# Patient Record
Sex: MALE | NOT HISPANIC OR LATINO | Employment: UNEMPLOYED
[De-identification: names, ages, dates, MRNs, and addresses within clinical notes are randomized per-mention and may not be internally consistent; named-entity substitution may affect disease eponyms.]

---

## 2018-03-23 ENCOUNTER — IMAGING SERVICES (OUTPATIENT)
Dept: OTHER | Age: 63
End: 2018-03-23

## 2018-03-23 ENCOUNTER — HOSPITAL (OUTPATIENT)
Dept: OTHER | Age: 63
End: 2018-03-23

## 2020-08-24 ENCOUNTER — APPOINTMENT (OUTPATIENT)
Dept: GENERAL RADIOLOGY | Age: 65
End: 2020-08-24
Attending: EMERGENCY MEDICINE

## 2020-08-24 ENCOUNTER — HOSPITAL ENCOUNTER (EMERGENCY)
Age: 65
Discharge: HOME | End: 2020-08-24
Attending: EMERGENCY MEDICINE

## 2020-08-24 ENCOUNTER — APPOINTMENT (OUTPATIENT)
Dept: CT IMAGING | Age: 65
End: 2020-08-24
Attending: EMERGENCY MEDICINE

## 2020-08-24 VITALS
HEART RATE: 70 BPM | WEIGHT: 198.41 LBS | RESPIRATION RATE: 16 BRPM | TEMPERATURE: 96.8 F | DIASTOLIC BLOOD PRESSURE: 79 MMHG | SYSTOLIC BLOOD PRESSURE: 122 MMHG | OXYGEN SATURATION: 97 %

## 2020-08-24 DIAGNOSIS — R55 SYNCOPE, UNSPECIFIED SYNCOPE TYPE: Primary | ICD-10-CM

## 2020-08-24 LAB
ANION GAP SERPL CALC-SCNC: 10 MMOL/L (ref 10–20)
ATRIAL RATE (BPM): 74
BASOPHILS # BLD: 0 K/MCL (ref 0–0.3)
BASOPHILS NFR BLD: 0 %
BUN SERPL-MCNC: 21 MG/DL (ref 6–20)
BUN/CREAT SERPL: 21 (ref 7–25)
CALCIUM SERPL-MCNC: 9.1 MG/DL (ref 8.4–10.2)
CHLORIDE SERPL-SCNC: 106 MMOL/L (ref 98–107)
CO2 SERPL-SCNC: 26 MMOL/L (ref 21–32)
CREAT SERPL-MCNC: 1.01 MG/DL (ref 0.67–1.17)
DIFFERENTIAL METHOD BLD: ABNORMAL
EOSINOPHIL # BLD: 0.2 K/MCL (ref 0.1–0.5)
EOSINOPHIL NFR BLD: 2 %
ERYTHROCYTE [DISTWIDTH] IN BLOOD: 14.6 % (ref 11–15)
GLUCOSE SERPL-MCNC: 87 MG/DL (ref 65–99)
HCT VFR BLD CALC: 39.3 % (ref 39–51)
HGB BLD-MCNC: 13.1 G/DL (ref 13–17)
IMM GRANULOCYTES # BLD AUTO: 0 K/MCL (ref 0–0.2)
IMM GRANULOCYTES NFR BLD: 0 %
LYMPHOCYTES # BLD: 1.5 K/MCL (ref 1–4)
LYMPHOCYTES NFR BLD: 17 %
MCH RBC QN AUTO: 30.2 PG (ref 26–34)
MCHC RBC AUTO-ENTMCNC: 33.3 G/DL (ref 32–36.5)
MCV RBC AUTO: 90.6 FL (ref 78–100)
MONOCYTES # BLD: 0.7 K/MCL (ref 0.3–0.9)
MONOCYTES NFR BLD: 8 %
NEUTROPHILS # BLD: 6.5 K/MCL (ref 1.8–7.7)
NEUTROPHILS NFR BLD: 73 %
NRBC BLD MANUAL-RTO: 0 /100 WBC
P AXIS (DEGREES): 51
PLATELET # BLD: 222 K/MCL (ref 140–450)
POTASSIUM SERPL-SCNC: 4.5 MMOL/L (ref 3.4–5.1)
PR-INTERVAL (MSEC): 181
QRS-INTERVAL (MSEC): 95
QT-INTERVAL (MSEC): 374
QTC: 415
R AXIS (DEGREES): -6
RBC # BLD: 4.34 MIL/MCL (ref 4.5–5.9)
REPORT TEXT: NORMAL
SODIUM SERPL-SCNC: 137 MMOL/L (ref 135–145)
T AXIS (DEGREES): 60
TROPONIN I SERPL HS-MCNC: <0.02 NG/ML
VENTRICULAR RATE EKG/MIN (BPM): 74
WBC # BLD: 8.9 K/MCL (ref 4.2–11)

## 2020-08-24 PROCEDURE — 71101 X-RAY EXAM UNILAT RIBS/CHEST: CPT

## 2020-08-24 PROCEDURE — 96374 THER/PROPH/DIAG INJ IV PUSH: CPT

## 2020-08-24 PROCEDURE — 10002800 HB RX 250 W HCPCS

## 2020-08-24 PROCEDURE — 72125 CT NECK SPINE W/O DYE: CPT

## 2020-08-24 PROCEDURE — 73502 X-RAY EXAM HIP UNI 2-3 VIEWS: CPT

## 2020-08-24 PROCEDURE — 10002800 HB RX 250 W HCPCS: Performed by: EMERGENCY MEDICINE

## 2020-08-24 PROCEDURE — 85025 COMPLETE CBC W/AUTO DIFF WBC: CPT

## 2020-08-24 PROCEDURE — 70450 CT HEAD/BRAIN W/O DYE: CPT

## 2020-08-24 PROCEDURE — 73030 X-RAY EXAM OF SHOULDER: CPT

## 2020-08-24 PROCEDURE — 96375 TX/PRO/DX INJ NEW DRUG ADDON: CPT

## 2020-08-24 PROCEDURE — 99285 EMERGENCY DEPT VISIT HI MDM: CPT | Performed by: EMERGENCY MEDICINE

## 2020-08-24 PROCEDURE — 99284 EMERGENCY DEPT VISIT MOD MDM: CPT

## 2020-08-24 PROCEDURE — 93005 ELECTROCARDIOGRAM TRACING: CPT | Performed by: EMERGENCY MEDICINE

## 2020-08-24 PROCEDURE — 73630 X-RAY EXAM OF FOOT: CPT

## 2020-08-24 PROCEDURE — 80048 BASIC METABOLIC PNL TOTAL CA: CPT

## 2020-08-24 PROCEDURE — 10002803 HB RX 637: Performed by: EMERGENCY MEDICINE

## 2020-08-24 PROCEDURE — 84484 ASSAY OF TROPONIN QUANT: CPT

## 2020-08-24 RX ORDER — HYDROCODONE BITARTRATE AND ACETAMINOPHEN 10; 325 MG/1; MG/1
1 TABLET ORAL ONCE
Status: COMPLETED | OUTPATIENT
Start: 2020-08-24 | End: 2020-08-24

## 2020-08-24 RX ORDER — KETOROLAC TROMETHAMINE 15 MG/ML
INJECTION, SOLUTION INTRAMUSCULAR; INTRAVENOUS
Status: COMPLETED
Start: 2020-08-24 | End: 2020-08-24

## 2020-08-24 RX ADMIN — KETOROLAC TROMETHAMINE 15 MG: 15 INJECTION, SOLUTION INTRAMUSCULAR; INTRAVENOUS at 14:59

## 2020-08-24 RX ADMIN — MORPHINE SULFATE 6 MG: 4 INJECTION, SOLUTION INTRAMUSCULAR; INTRAVENOUS at 15:11

## 2020-08-24 RX ADMIN — HYDROCODONE BITARTRATE AND ACETAMINOPHEN 1 TABLET: 10; 325 TABLET ORAL at 12:52

## 2020-08-24 ASSESSMENT — ENCOUNTER SYMPTOMS
NUMBNESS: 0
CONSTIPATION: 0
DIARRHEA: 0
FEVER: 0
SHORTNESS OF BREATH: 0
HEADACHES: 0
COUGH: 0
NAUSEA: 0
FATIGUE: 0
RHINORRHEA: 0
EYE PAIN: 0
WEAKNESS: 0
LIGHT-HEADEDNESS: 0
CHILLS: 0
DIZZINESS: 0
ABDOMINAL PAIN: 0
VOMITING: 0
SORE THROAT: 0

## 2020-08-24 ASSESSMENT — PAIN SCALES - GENERAL: PAINLEVEL_OUTOF10: 9

## 2020-09-27 ENCOUNTER — HOSPITAL (OUTPATIENT)
Dept: OTHER | Age: 65
End: 2020-09-27

## 2020-09-27 ENCOUNTER — DIAGNOSTIC TRANS (OUTPATIENT)
Dept: OTHER | Age: 65
End: 2020-09-27

## 2020-09-27 LAB
ALBUMIN SERPL-MCNC: 4.2 G/DL (ref 3.6–5.1)
ALBUMIN/GLOB SERPL: 0.9 {RATIO} (ref 1–2.4)
ALP SERPL-CCNC: 120 UNITS/L (ref 45–117)
ALT SERPL-CCNC: 25 UNITS/L
ANALYZER ANC (IANC): ABNORMAL
ANION GAP SERPL CALC-SCNC: 19 MMOL/L (ref 10–20)
AST SERPL-CCNC: 26 UNITS/L
BASE DEFICIT BLDA-SCNC: ABNORMAL MMOL/L
BASE EXCESS BLDA CALC-SCNC: 1 MMOL/L (ref 0–3)
BASOPHILS # BLD: 0 K/MCL (ref 0–0.3)
BASOPHILS NFR BLD: 0 %
BDY SITE: ABNORMAL
BILIRUB SERPL-MCNC: 0.7 MG/DL (ref 0.2–1)
BODY TEMPERATURE: 37 DEGREES
BUN SERPL-MCNC: 30 MG/DL (ref 6–20)
BUN SERPL-MCNC: 36 MG/DL (ref 6–20)
BUN SERPL-MCNC: 37 MG/DL (ref 6–20)
BUN SERPL-MCNC: 39 MG/DL (ref 6–20)
BUN/CREAT SERPL: 12 (ref 7–25)
BUN/CREAT SERPL: 19 (ref 7–25)
BUN/CREAT SERPL: 22 (ref 7–25)
BUN/CREAT SERPL: 22 (ref 7–25)
CA-I BLD ISE-SCNC: 1.2 MMOL/L (ref 1.15–1.29)
CA-I BLDA-SCNC: ABNORMAL MMOL/L
CALCIUM SERPL-MCNC: 9.8 MG/DL (ref 8.4–10.2)
CHLORIDE BLD-SCNC: 103 MMOL/L (ref 98–107)
CHLORIDE SERPL-SCNC: 102 MMOL/L (ref 98–107)
CO2 SERPL-SCNC: 26 MMOL/L (ref 21–32)
COHGB MFR BLD: 1.4 %
CONDITION-RC: ABNORMAL
CONDITION: ABNORMAL
CREAT SERPL-MCNC: 1.35 MG/DL (ref 0.67–1.17)
CREAT SERPL-MCNC: 1.65 MG/DL (ref 0.67–1.17)
CREAT SERPL-MCNC: 2.01 MG/DL (ref 0.67–1.17)
CREAT SERPL-MCNC: 3.12 MG/DL (ref 0.67–1.17)
DIFFERENTIAL METHOD BLD: ABNORMAL
EOSINOPHIL # BLD: 0 K/MCL (ref 0.1–0.5)
EOSINOPHIL NFR BLD: 0 %
ERYTHROCYTE [DISTWIDTH] IN BLOOD: 13.7 % (ref 11–15)
ETHANOL SERPL-MCNC: NORMAL MG/DL
GLOBULIN SER-MCNC: 4.5 G/DL (ref 2–4)
GLUCOSE BLD-MCNC: 112 MG/DL (ref 65–99)
GLUCOSE SERPL-MCNC: 126 MG/DL (ref 65–99)
HCO3 BLDA-SCNC: 26 MMOL/L (ref 22–28)
HCT VFR BLD CALC: 40.3 % (ref 39–51)
HGB BLD-MCNC: 11.9 G/DL (ref 13–17)
HGB BLD-MCNC: 13.4 G/DL (ref 13–17)
HOROWITZ INDEX BLD+IHG-RTO: 100 %
HOROWITZ INDEX BLDA+IHG-RTO: 480 MM[HG] (ref 300–500)
IMM GRANULOCYTES # BLD AUTO: 0.1 K/MCL (ref 0–0.2)
IMM GRANULOCYTES NFR BLD: 1 %
LACTATE BLDA-MCNC: 0.9 MMOL/L
LYMPHOCYTES # BLD: 1.2 K/MCL (ref 1–4)
LYMPHOCYTES NFR BLD: 8 %
MCH RBC QN AUTO: 30.6 PG (ref 26–34)
MCHC RBC AUTO-ENTMCNC: 33.3 G/DL (ref 32–36.5)
MCV RBC AUTO: 92 FL (ref 78–100)
METHGB MFR BLD: 0.5 %
MONOCYTES # BLD: 1.4 K/MCL (ref 0.3–0.9)
MONOCYTES NFR BLD: 10 %
NEUTROPHILS # BLD: 11.7 K/MCL (ref 1.8–7.7)
NEUTROPHILS NFR BLD: 81 %
NEUTS SEG NFR BLD: ABNORMAL %
NRBC BLD MANUAL-RTO: 0 /100 WBC
OXYHGB MFR BLD: >98.5 % (ref 94–98)
PCO2 BLDA: 43 MM HG (ref 35–48)
PH BLDA: 7.38 UNITS (ref 7.35–7.45)
PLATELET # BLD: 229 K/MCL (ref 140–450)
PO2 BLDA: 480 MM HG (ref 83–108)
POTASSIUM BLD-SCNC: 4.5 MMOL/L (ref 3.4–5.1)
POTASSIUM SERPL-SCNC: 4.7 MMOL/L (ref 3.4–5.1)
PROT SERPL-MCNC: 8.7 G/DL (ref 6.4–8.2)
RBC # BLD: 4.38 MIL/MCL (ref 4.5–5.9)
SAO2 % BLDA: >100 % (ref 95–99)
SARS-COV-2 RNA RESP QL NAA+PROBE: NOT DETECTED
SERVICE CMNT-IMP: NORMAL
SODIUM BLD-SCNC: 137 MMOL/L (ref 135–145)
SODIUM SERPL-SCNC: 142 MMOL/L (ref 135–145)
SPECIMEN SOURCE: NORMAL
TROPONIN I SERPL HS-MCNC: <0.02 NG/ML
WBC # BLD: 14.4 K/MCL (ref 4.2–11)

## 2020-09-27 PROCEDURE — 99284 EMERGENCY DEPT VISIT MOD MDM: CPT | Performed by: EMERGENCY MEDICINE

## 2020-09-27 PROCEDURE — 99223 1ST HOSP IP/OBS HIGH 75: CPT | Performed by: SURGERY

## 2020-09-28 LAB
ANALYZER ANC (IANC): ABNORMAL
ANION GAP SERPL CALC-SCNC: 10 MMOL/L (ref 10–20)
BASOPHILS # BLD: 0 K/MCL (ref 0–0.3)
BASOPHILS NFR BLD: 1 %
BUN SERPL-MCNC: 21 MG/DL (ref 6–20)
BUN/CREAT SERPL: 19 (ref 7–25)
CALCIUM SERPL-MCNC: 8.6 MG/DL (ref 8.4–10.2)
CHLORIDE SERPL-SCNC: 103 MMOL/L (ref 98–107)
CO2 SERPL-SCNC: 29 MMOL/L (ref 21–32)
CREAT SERPL-MCNC: 1.09 MG/DL (ref 0.67–1.17)
DIFFERENTIAL METHOD BLD: ABNORMAL
EOSINOPHIL # BLD: 0.1 K/MCL (ref 0.1–0.5)
EOSINOPHIL NFR BLD: 3 %
ERYTHROCYTE [DISTWIDTH] IN BLOOD: 13.3 % (ref 11–15)
GLUCOSE SERPL-MCNC: 74 MG/DL (ref 65–99)
HCT VFR BLD CALC: 31.4 % (ref 39–51)
HGB BLD-MCNC: 10.4 G/DL (ref 13–17)
IMM GRANULOCYTES # BLD AUTO: 0 K/MCL (ref 0–0.2)
IMM GRANULOCYTES NFR BLD: 0 %
LYMPHOCYTES # BLD: 1.1 K/MCL (ref 1–4)
LYMPHOCYTES NFR BLD: 25 %
MAGNESIUM SERPL-MCNC: 2 MG/DL (ref 1.7–2.4)
MCH RBC QN AUTO: 30.9 PG (ref 26–34)
MCHC RBC AUTO-ENTMCNC: 33.1 G/DL (ref 32–36.5)
MCV RBC AUTO: 93.2 FL (ref 78–100)
MONOCYTES # BLD: 0.5 K/MCL (ref 0.3–0.9)
MONOCYTES NFR BLD: 12 %
NEUTROPHILS # BLD: 2.6 K/MCL (ref 1.8–7.7)
NEUTROPHILS NFR BLD: 59 %
NEUTS SEG NFR BLD: ABNORMAL %
NRBC BLD MANUAL-RTO: 0 /100 WBC
PHOSPHATE SERPL-MCNC: 2.9 MG/DL (ref 2.4–4.7)
PLATELET # BLD: 134 K/MCL (ref 140–450)
POTASSIUM SERPL-SCNC: 3.9 MMOL/L (ref 3.4–5.1)
RBC # BLD: 3.37 MIL/MCL (ref 4.5–5.9)
SODIUM SERPL-SCNC: 138 MMOL/L (ref 135–145)
WBC # BLD: 4.4 K/MCL (ref 4.2–11)

## 2020-09-28 PROCEDURE — 99233 SBSQ HOSP IP/OBS HIGH 50: CPT | Performed by: SURGERY

## 2020-09-29 LAB
ANION GAP SERPL CALC-SCNC: 11 MMOL/L (ref 10–20)
BUN SERPL-MCNC: 22 MG/DL (ref 6–20)
BUN/CREAT SERPL: 19 (ref 7–25)
CALCIUM SERPL-MCNC: 8.5 MG/DL (ref 8.4–10.2)
CHLORIDE SERPL-SCNC: 100 MMOL/L (ref 98–107)
CO2 SERPL-SCNC: 28 MMOL/L (ref 21–32)
CREAT SERPL-MCNC: 1.14 MG/DL (ref 0.67–1.17)
GLUCOSE SERPL-MCNC: 113 MG/DL (ref 65–99)
POTASSIUM SERPL-SCNC: 3.8 MMOL/L (ref 3.4–5.1)
SODIUM SERPL-SCNC: 135 MMOL/L (ref 135–145)

## 2020-09-29 PROCEDURE — 99238 HOSP IP/OBS DSCHRG MGMT 30/<: CPT | Performed by: SURGERY

## 2021-06-08 ENCOUNTER — HOSPITAL ENCOUNTER (EMERGENCY)
Age: 66
Discharge: PSYCHIATRIC OR SUBSTANCE USE FACILITY | End: 2021-06-09
Attending: EMERGENCY MEDICINE

## 2021-06-08 ENCOUNTER — APPOINTMENT (OUTPATIENT)
Dept: CT IMAGING | Age: 66
End: 2021-06-08
Attending: EMERGENCY MEDICINE

## 2021-06-08 DIAGNOSIS — R07.9 ACUTE CHEST PAIN: Primary | ICD-10-CM

## 2021-06-08 LAB
ANION GAP SERPL CALC-SCNC: 8 MMOL/L (ref 10–20)
BASOPHILS # BLD: 0 K/MCL (ref 0–0.3)
BASOPHILS NFR BLD: 0 %
BUN SERPL-MCNC: 18 MG/DL (ref 6–20)
BUN/CREAT SERPL: 18 (ref 7–25)
CALCIUM SERPL-MCNC: 9.3 MG/DL (ref 8.4–10.2)
CHLORIDE SERPL-SCNC: 107 MMOL/L (ref 98–107)
CO2 SERPL-SCNC: 27 MMOL/L (ref 21–32)
CREAT SERPL-MCNC: 0.98 MG/DL (ref 0.67–1.17)
D DIMER PPP FEU-MCNC: 1.73 MG/L (FEU)
DEPRECATED RDW RBC: 45.1 FL (ref 39–50)
EOSINOPHIL # BLD: 0.1 K/MCL (ref 0–0.5)
EOSINOPHIL NFR BLD: 3 %
ERYTHROCYTE [DISTWIDTH] IN BLOOD: 14 % (ref 11–15)
FASTING DURATION TIME PATIENT: ABNORMAL H
GFR SERPLBLD BASED ON 1.73 SQ M-ARVRAT: 81 ML/MIN/1.73M2
GLUCOSE SERPL-MCNC: 93 MG/DL (ref 65–99)
HCT VFR BLD CALC: 37.7 % (ref 39–51)
HGB BLD-MCNC: 12.2 G/DL (ref 13–17)
IMM GRANULOCYTES # BLD AUTO: 0 K/MCL (ref 0–0.2)
IMM GRANULOCYTES # BLD: 1 %
LYMPHOCYTES # BLD: 1.4 K/MCL (ref 1–4)
LYMPHOCYTES NFR BLD: 26 %
MCH RBC QN AUTO: 28.4 PG (ref 26–34)
MCHC RBC AUTO-ENTMCNC: 32.4 G/DL (ref 32–36.5)
MCV RBC AUTO: 87.9 FL (ref 78–100)
MONOCYTES # BLD: 0.6 K/MCL (ref 0.3–0.9)
MONOCYTES NFR BLD: 11 %
NEUTROPHILS # BLD: 3.3 K/MCL (ref 1.8–7.7)
NEUTROPHILS NFR BLD: 59 %
NRBC BLD MANUAL-RTO: 0 /100 WBC
PLATELET # BLD AUTO: 205 K/MCL (ref 140–450)
POTASSIUM SERPL-SCNC: 4.4 MMOL/L (ref 3.4–5.1)
RBC # BLD: 4.29 MIL/MCL (ref 4.5–5.9)
SODIUM SERPL-SCNC: 138 MMOL/L (ref 135–145)
TROPONIN I SERPL HS-MCNC: <0.02 NG/ML
TROPONIN I SERPL HS-MCNC: <0.02 NG/ML
WBC # BLD: 5.5 K/MCL (ref 4.2–11)

## 2021-06-08 PROCEDURE — 10004651 HB RX, NO CHARGE ITEM: Performed by: EMERGENCY MEDICINE

## 2021-06-08 PROCEDURE — 80048 BASIC METABOLIC PNL TOTAL CA: CPT | Performed by: EMERGENCY MEDICINE

## 2021-06-08 PROCEDURE — 99285 EMERGENCY DEPT VISIT HI MDM: CPT

## 2021-06-08 PROCEDURE — 85379 FIBRIN DEGRADATION QUANT: CPT | Performed by: EMERGENCY MEDICINE

## 2021-06-08 PROCEDURE — 10002805 HB CONTRAST AGENT: Performed by: EMERGENCY MEDICINE

## 2021-06-08 PROCEDURE — 99285 EMERGENCY DEPT VISIT HI MDM: CPT | Performed by: EMERGENCY MEDICINE

## 2021-06-08 PROCEDURE — 84484 ASSAY OF TROPONIN QUANT: CPT | Performed by: EMERGENCY MEDICINE

## 2021-06-08 PROCEDURE — 36415 COLL VENOUS BLD VENIPUNCTURE: CPT

## 2021-06-08 PROCEDURE — 71275 CT ANGIOGRAPHY CHEST: CPT

## 2021-06-08 PROCEDURE — 93010 ELECTROCARDIOGRAM REPORT: CPT | Performed by: INTERNAL MEDICINE

## 2021-06-08 PROCEDURE — 85025 COMPLETE CBC W/AUTO DIFF WBC: CPT | Performed by: EMERGENCY MEDICINE

## 2021-06-08 PROCEDURE — 93005 ELECTROCARDIOGRAM TRACING: CPT | Performed by: EMERGENCY MEDICINE

## 2021-06-08 PROCEDURE — 10002803 HB RX 637: Performed by: EMERGENCY MEDICINE

## 2021-06-08 RX ORDER — ASPIRIN 81 MG/1
324 TABLET, CHEWABLE ORAL ONCE
Status: COMPLETED | OUTPATIENT
Start: 2021-06-08 | End: 2021-06-08

## 2021-06-08 RX ORDER — NICOTINE 21 MG/24HR
1 PATCH, TRANSDERMAL 24 HOURS TRANSDERMAL ONCE
Status: DISCONTINUED | OUTPATIENT
Start: 2021-06-08 | End: 2021-06-09 | Stop reason: HOSPADM

## 2021-06-08 RX ORDER — MORPHINE SULFATE 10 MG/5ML
10 SOLUTION ORAL ONCE
Status: COMPLETED | OUTPATIENT
Start: 2021-06-08 | End: 2021-06-08

## 2021-06-08 RX ADMIN — MORPHINE SULFATE 10 MG: 10 SOLUTION ORAL at 19:32

## 2021-06-08 RX ADMIN — IOHEXOL 100 ML: 350 INJECTION, SOLUTION INTRAVENOUS at 20:35

## 2021-06-08 RX ADMIN — NICOTINE 1 PATCH: 14 PATCH TRANSDERMAL at 21:31

## 2021-06-08 RX ADMIN — ASPIRIN 81 MG 324 MG: 81 TABLET ORAL at 18:51

## 2021-06-08 ASSESSMENT — ENCOUNTER SYMPTOMS
ABDOMINAL PAIN: 0
DIAPHORESIS: 0
COLOR CHANGE: 0
SHORTNESS OF BREATH: 0

## 2021-06-08 ASSESSMENT — HEART SCORE
AGE: GREATER THAN 45 TO LESS THAN 65
HISTORY: SLIGHTLY SUSPICIOUS
EKG: NORMAL
RISK FACTORS: 1-2 RISK FACTORS

## 2021-06-09 VITALS
TEMPERATURE: 97 F | RESPIRATION RATE: 15 BRPM | SYSTOLIC BLOOD PRESSURE: 160 MMHG | DIASTOLIC BLOOD PRESSURE: 88 MMHG | OXYGEN SATURATION: 99 % | HEART RATE: 77 BPM

## 2021-06-09 LAB
ATRIAL RATE (BPM): 87
P AXIS (DEGREES): 17
PR-INTERVAL (MSEC): 179
QRS-INTERVAL (MSEC): 100
QT-INTERVAL (MSEC): 377
QTC: 451
R AXIS (DEGREES): -25
REPORT TEXT: NORMAL
T AXIS (DEGREES): 60
VENTRICULAR RATE EKG/MIN (BPM): 86

## 2023-04-01 ENCOUNTER — HOSPITAL ENCOUNTER (INPATIENT)
Dept: HOSPITAL 12 - ER | Age: 68
LOS: 13 days | Discharge: NURSING, REHAB OR LONG-TERM CARE | DRG: 885 | End: 2023-04-14
Payer: MEDICARE

## 2023-04-01 VITALS — WEIGHT: 217 LBS | HEIGHT: 70 IN | BODY MASS INDEX: 31.07 KG/M2

## 2023-04-01 VITALS — SYSTOLIC BLOOD PRESSURE: 148 MMHG | DIASTOLIC BLOOD PRESSURE: 79 MMHG

## 2023-04-01 DIAGNOSIS — I25.10: ICD-10-CM

## 2023-04-01 DIAGNOSIS — Z95.5: ICD-10-CM

## 2023-04-01 DIAGNOSIS — F19.10: ICD-10-CM

## 2023-04-01 DIAGNOSIS — M19.90: ICD-10-CM

## 2023-04-01 DIAGNOSIS — E78.5: ICD-10-CM

## 2023-04-01 DIAGNOSIS — R79.89: ICD-10-CM

## 2023-04-01 DIAGNOSIS — Z88.8: ICD-10-CM

## 2023-04-01 DIAGNOSIS — F11.10: ICD-10-CM

## 2023-04-01 DIAGNOSIS — F25.0: Primary | ICD-10-CM

## 2023-04-01 DIAGNOSIS — Z91.013: ICD-10-CM

## 2023-04-01 DIAGNOSIS — I11.0: ICD-10-CM

## 2023-04-01 DIAGNOSIS — G89.4: ICD-10-CM

## 2023-04-01 DIAGNOSIS — F17.290: ICD-10-CM

## 2023-04-01 DIAGNOSIS — F14.90: ICD-10-CM

## 2023-04-01 DIAGNOSIS — D64.9: ICD-10-CM

## 2023-04-01 DIAGNOSIS — E66.9: ICD-10-CM

## 2023-04-01 DIAGNOSIS — I25.2: ICD-10-CM

## 2023-04-01 DIAGNOSIS — F41.9: ICD-10-CM

## 2023-04-01 DIAGNOSIS — Z95.1: ICD-10-CM

## 2023-04-01 DIAGNOSIS — I50.9: ICD-10-CM

## 2023-04-01 PROCEDURE — A4663 DIALYSIS BLOOD PRESSURE CUFF: HCPCS

## 2023-04-01 RX ADMIN — TEMAZEPAM PRN MG: 7.5 CAPSULE ORAL at 23:24

## 2023-04-01 RX ADMIN — ACETAMINOPHEN PRN MG: 325 TABLET ORAL at 23:24

## 2023-04-01 NOTE — NUR
Patient is now eating hot dinner tray with fair appetite, pending medical 
screening exam by our ER doctor at this time.

## 2023-04-01 NOTE — NUR
Patient is now medically cleared by Dr Alfaro.  Patient is admitted to [facility] 
room 137-B, under the care of psychiatrist Dr. Sen/[facility] hospitalist 
Lois.  [facility] unit nurse Sabina accepted nursing report. Belongings List 
completed.

## 2023-04-02 VITALS — DIASTOLIC BLOOD PRESSURE: 66 MMHG | SYSTOLIC BLOOD PRESSURE: 136 MMHG

## 2023-04-02 VITALS — DIASTOLIC BLOOD PRESSURE: 67 MMHG | SYSTOLIC BLOOD PRESSURE: 135 MMHG

## 2023-04-02 VITALS — DIASTOLIC BLOOD PRESSURE: 70 MMHG | SYSTOLIC BLOOD PRESSURE: 147 MMHG

## 2023-04-02 LAB
ALP SERPL-CCNC: 126 U/L (ref 50–136)
ALT SERPL W/O P-5'-P-CCNC: 32 U/L (ref 16–63)
AST SERPL-CCNC: 32 U/L (ref 15–37)
BILIRUB SERPL-MCNC: 0.4 MG/DL (ref 0.2–1)
BUN SERPL-MCNC: 21 MG/DL (ref 7–18)
CHLORIDE SERPL-SCNC: 104 MMOL/L (ref 98–107)
CHOLEST SERPL-MCNC: 126 MG/DL (ref ?–200)
CO2 SERPL-SCNC: 30 MMOL/L (ref 21–32)
CREAT SERPL-MCNC: 1 MG/DL (ref 0.6–1.3)
GLUCOSE SERPL-MCNC: 100 MG/DL (ref 74–106)
HDLC SERPL-MCNC: 53 MG/DL (ref 40–60)
POTASSIUM SERPL-SCNC: 4 MMOL/L (ref 3.5–5.1)
TRIGL SERPL-MCNC: 97 MG/DL (ref 30–150)
WS STN SPEC: 7.7 G/DL (ref 6.4–8.2)

## 2023-04-02 RX ADMIN — CARVEDILOL SCH MG: 6.25 TABLET, FILM COATED ORAL at 20:28

## 2023-04-02 RX ADMIN — HYDROCODONE BITARTRATE AND ACETAMINOPHEN PRN TAB: 10; 325 TABLET ORAL at 12:55

## 2023-04-02 RX ADMIN — LORAZEPAM PRN MG: 0.5 TABLET ORAL at 14:34

## 2023-04-02 RX ADMIN — HYDROCODONE BITARTRATE AND ACETAMINOPHEN PRN TAB: 10; 325 TABLET ORAL at 17:07

## 2023-04-02 RX ADMIN — ATORVASTATIN CALCIUM SCH MG: 40 TABLET, FILM COATED ORAL at 20:28

## 2023-04-02 RX ADMIN — GABAPENTIN SCH MG: 300 CAPSULE ORAL at 16:44

## 2023-04-02 RX ADMIN — QUETIAPINE SCH MG: 200 TABLET, FILM COATED ORAL at 20:29

## 2023-04-02 RX ADMIN — ACETAMINOPHEN PRN MG: 325 TABLET ORAL at 08:32

## 2023-04-02 RX ADMIN — HYDROCODONE BITARTRATE AND ACETAMINOPHEN PRN TAB: 10; 325 TABLET ORAL at 20:26

## 2023-04-02 RX ADMIN — LORAZEPAM PRN MG: 0.5 TABLET ORAL at 08:32

## 2023-04-02 RX ADMIN — TEMAZEPAM PRN MG: 7.5 CAPSULE ORAL at 20:25

## 2023-04-02 NOTE — NUR
GPS: Nursing Notes: Destructive Behavior To Others:

Patient is awake and responding to his name, needs prompting to participate in therapeutic 
groups, A/Ox4, denies SI/HI, denies trying to hurt anybody, cooperative with nursing care, 
properly groomed by self, unable to formulate a viable plan for self care, continue to 
monitor for safety, compliant with his medications, continue with treatment plan.

## 2023-04-02 NOTE — NUR
Nursing Admission Notes: Received 69 y/o male brought in via ER staff on WC. Pt is on a 5150 
(72 hour) hold d/t DTO. Where pt currently resides ([facility]) staff reported he 
was acting suspicious and paranoid, not following directions, began threatening staff and 
believed that people were trying to kill him. He also threatened his staff, so they placed 
him into restraints.

Upon F2F assessment, pt was AOx3, disheveled, anxious, and somewhat down and withdrawn. He 
was cooperative and followed directions. Pt is self-ambulatory, yet has an unsteady gait. He 
is self care; able to feed and drink by himself, as snacks were given which he also finished 
100%. He is a poor historian, and denied substance abuse, even though his ER urine 
toxicology tested positive for cocaine, opiates, and methadone. Pt is Dx being Bipolar, with 
no significant medical problems that require intervention at this time. Pt requested 
medication to help with sleep and minor aches and pains in his (R) shoulder, (L) knee, and 
lower back. This nurse gave Restoril 7.5 mg, and Tylenol 650 mg po prn. Medications were 
positively effective. Kelly SI, HI. Contracted for safety. Pt was given his advisement and 
patient's rights, placed into his folder, and left on his bedside table. His belongings were 
collected and accounted for for safe keeping. Pt will be in the care of Dr. Sen and Dr. Oconnor.

## 2023-04-03 VITALS — DIASTOLIC BLOOD PRESSURE: 78 MMHG | SYSTOLIC BLOOD PRESSURE: 157 MMHG

## 2023-04-03 VITALS — SYSTOLIC BLOOD PRESSURE: 138 MMHG | DIASTOLIC BLOOD PRESSURE: 70 MMHG

## 2023-04-03 VITALS — SYSTOLIC BLOOD PRESSURE: 116 MMHG | DIASTOLIC BLOOD PRESSURE: 90 MMHG

## 2023-04-03 RX ADMIN — HYDROCODONE BITARTRATE AND ACETAMINOPHEN PRN TAB: 10; 325 TABLET ORAL at 13:10

## 2023-04-03 RX ADMIN — GABAPENTIN SCH MG: 300 CAPSULE ORAL at 16:21

## 2023-04-03 RX ADMIN — LORAZEPAM PRN MG: 0.5 TABLET ORAL at 04:15

## 2023-04-03 RX ADMIN — CARVEDILOL SCH MG: 6.25 TABLET, FILM COATED ORAL at 20:26

## 2023-04-03 RX ADMIN — TEMAZEPAM PRN MG: 7.5 CAPSULE ORAL at 21:55

## 2023-04-03 RX ADMIN — LISINOPRIL SCH MG: 10 TABLET ORAL at 08:47

## 2023-04-03 RX ADMIN — LORAZEPAM PRN MG: 0.5 TABLET ORAL at 10:36

## 2023-04-03 RX ADMIN — METHADONE HYDROCHLORIDE SCH MG: 10 CONCENTRATE ORAL at 15:00

## 2023-04-03 RX ADMIN — QUETIAPINE SCH MG: 200 TABLET, FILM COATED ORAL at 20:26

## 2023-04-03 RX ADMIN — LORAZEPAM PRN MG: 0.5 TABLET ORAL at 16:36

## 2023-04-03 RX ADMIN — HYDROCODONE BITARTRATE AND ACETAMINOPHEN PRN TAB: 10; 325 TABLET ORAL at 04:15

## 2023-04-03 RX ADMIN — ACETAMINOPHEN PRN MG: 325 TABLET ORAL at 20:26

## 2023-04-03 RX ADMIN — CARVEDILOL SCH MG: 6.25 TABLET, FILM COATED ORAL at 08:48

## 2023-04-03 RX ADMIN — ATORVASTATIN CALCIUM SCH MG: 40 TABLET, FILM COATED ORAL at 20:26

## 2023-04-03 RX ADMIN — LORAZEPAM PRN MG: 0.5 TABLET ORAL at 20:26

## 2023-04-03 RX ADMIN — GABAPENTIN SCH MG: 300 CAPSULE ORAL at 08:47

## 2023-04-03 RX ADMIN — HYDROCODONE BITARTRATE AND ACETAMINOPHEN PRN TAB: 10; 325 TABLET ORAL at 09:13

## 2023-04-03 RX ADMIN — ASPIRIN SCH MG: 81 TABLET, CHEWABLE ORAL at 08:47

## 2023-04-03 NOTE — NUR
Pt is round-the-clock with Norco and Ativan. If not given, or if trying to educate on the 
possible s/e such as addiction or respiratory depression, pt get easily upset/irritable. 
Stressed the importance of not abusing these meds. Will continue to monitor.

## 2023-04-03 NOTE — NUR
Initial Discharge Note:



Patient currently resides at [address] [place] [address] and will not be allowed to 
return back to this facility. Patient states he would like to be discharged to a new 
facility. SW will work with the patient and MD to ensure a safe and proper discharge.

## 2023-04-03 NOTE — NUR
Received pt wandering the halls aimlessly. Has poor insight and cannot form a plan of care. 
Sometimes may be hard to direct, but overall is compliant with POC. Pt is aware of all 
medications taken. Verbalizes understanding. Stayed in room most of shift, but can be 
intrusive at times. Denied SI/HI, and contracted for safety.

## 2023-04-03 NOTE — NUR
GPS: Nursing Notes: Destructive Behavior To Others:

Patient is awake and responding to his name, cooperative with nursing care, compliant with 
his medications, needs prompting to participate in therapeutic groups, A/Ox4, unable to 
formulate a viable plan for self care, continue to monitor for safety, stated "I am going to 
be good here..", continue with treatment plan.

## 2023-04-03 NOTE — NUR
Firearms Report:



 completed and submitted a [employer] firearms report for 5150 danger to others 
certifications. A copy of report has been placed in patient chart.

## 2023-04-04 VITALS — SYSTOLIC BLOOD PRESSURE: 111 MMHG | DIASTOLIC BLOOD PRESSURE: 50 MMHG

## 2023-04-04 VITALS — SYSTOLIC BLOOD PRESSURE: 112 MMHG | DIASTOLIC BLOOD PRESSURE: 48 MMHG

## 2023-04-04 VITALS — DIASTOLIC BLOOD PRESSURE: 43 MMHG | SYSTOLIC BLOOD PRESSURE: 116 MMHG

## 2023-04-04 RX ADMIN — MUPIROCIN SCH GM: 20 OINTMENT TOPICAL at 20:29

## 2023-04-04 RX ADMIN — ATORVASTATIN CALCIUM SCH MG: 40 TABLET, FILM COATED ORAL at 20:12

## 2023-04-04 RX ADMIN — CARVEDILOL SCH MG: 6.25 TABLET, FILM COATED ORAL at 09:00

## 2023-04-04 RX ADMIN — GABAPENTIN SCH MG: 300 CAPSULE ORAL at 08:58

## 2023-04-04 RX ADMIN — ACETAMINOPHEN PRN MG: 325 TABLET ORAL at 11:39

## 2023-04-04 RX ADMIN — QUETIAPINE SCH MG: 200 TABLET, FILM COATED ORAL at 20:10

## 2023-04-04 RX ADMIN — ACETAMINOPHEN PRN MG: 325 TABLET ORAL at 20:12

## 2023-04-04 RX ADMIN — LORAZEPAM PRN MG: 0.5 TABLET ORAL at 11:39

## 2023-04-04 RX ADMIN — METHADONE HYDROCHLORIDE SCH MG: 10 CONCENTRATE ORAL at 09:43

## 2023-04-04 RX ADMIN — GABAPENTIN SCH MG: 300 CAPSULE ORAL at 16:57

## 2023-04-04 RX ADMIN — LORAZEPAM PRN MG: 0.5 TABLET ORAL at 17:38

## 2023-04-04 RX ADMIN — CARVEDILOL SCH MG: 6.25 TABLET, FILM COATED ORAL at 20:11

## 2023-04-04 RX ADMIN — TEMAZEPAM PRN MG: 7.5 CAPSULE ORAL at 20:10

## 2023-04-04 RX ADMIN — ASPIRIN SCH MG: 81 TABLET, CHEWABLE ORAL at 08:58

## 2023-04-04 RX ADMIN — LISINOPRIL SCH MG: 10 TABLET ORAL at 09:00

## 2023-04-04 NOTE — NUR
GPS: Nursing Notes: Destructive Behavior To Others:

Patient is awake and responding to his name, cooperative with nursing care, isolative and 
withdrawn in his room this AM, unable to formulate a viable plan for self care, 
argumentative at times, following staff directions, compliant with his medications, denies 
SI/HI, continue to monitor for safety, no aggressive behavior noted, continue with treatment 
plan.

## 2023-04-05 VITALS — DIASTOLIC BLOOD PRESSURE: 56 MMHG | SYSTOLIC BLOOD PRESSURE: 107 MMHG

## 2023-04-05 VITALS — DIASTOLIC BLOOD PRESSURE: 73 MMHG | SYSTOLIC BLOOD PRESSURE: 133 MMHG

## 2023-04-05 VITALS — DIASTOLIC BLOOD PRESSURE: 72 MMHG | SYSTOLIC BLOOD PRESSURE: 124 MMHG

## 2023-04-05 RX ADMIN — QUETIAPINE SCH MG: 25 TABLET, FILM COATED ORAL at 10:21

## 2023-04-05 RX ADMIN — ATORVASTATIN CALCIUM SCH MG: 40 TABLET, FILM COATED ORAL at 20:29

## 2023-04-05 RX ADMIN — CARVEDILOL SCH MG: 6.25 TABLET, FILM COATED ORAL at 20:30

## 2023-04-05 RX ADMIN — LORAZEPAM PRN MG: 0.5 TABLET ORAL at 13:13

## 2023-04-05 RX ADMIN — GABAPENTIN SCH MG: 300 CAPSULE ORAL at 17:23

## 2023-04-05 RX ADMIN — MUPIROCIN SCH GM: 20 OINTMENT TOPICAL at 08:21

## 2023-04-05 RX ADMIN — QUETIAPINE SCH MG: 200 TABLET, FILM COATED ORAL at 20:29

## 2023-04-05 RX ADMIN — GABAPENTIN SCH MG: 300 CAPSULE ORAL at 08:12

## 2023-04-05 RX ADMIN — LORAZEPAM PRN MG: 0.5 TABLET ORAL at 06:13

## 2023-04-05 RX ADMIN — LORAZEPAM PRN MG: 0.5 TABLET ORAL at 19:39

## 2023-04-05 RX ADMIN — METHADONE HYDROCHLORIDE SCH MG: 10 CONCENTRATE ORAL at 08:12

## 2023-04-05 RX ADMIN — ASPIRIN SCH MG: 81 TABLET, CHEWABLE ORAL at 08:11

## 2023-04-05 RX ADMIN — QUETIAPINE SCH MG: 25 TABLET, FILM COATED ORAL at 17:23

## 2023-04-05 RX ADMIN — CARVEDILOL SCH MG: 6.25 TABLET, FILM COATED ORAL at 08:11

## 2023-04-05 RX ADMIN — MUPIROCIN SCH APPLIC: 20 OINTMENT TOPICAL at 20:29

## 2023-04-05 RX ADMIN — TEMAZEPAM PRN MG: 7.5 CAPSULE ORAL at 20:29

## 2023-04-05 RX ADMIN — LISINOPRIL SCH MG: 10 TABLET ORAL at 08:12

## 2023-04-05 NOTE — NUR
Patient is AOx3. Can be needy and attention seeking with moments of intrusive behavior. 
Compliant with POC, patient is able to be re-directed and verbalizes understanding. He is 
round-the-clock with prn medications, especially his pain and mood stabilizing meds. Pt gets 
easily irritable when his needs are not met. Denies SI/HI, and contracted for safety.

## 2023-04-05 NOTE — NUR
Pt is A/O X3 , compliant with medication and care. Pt is needy asking for snacks constantly 
and grabbing snacks from the food cart without asking.Pt is Hypersexual making sexual 
inappropriate comments to female staff. Pt kept calling female staff baby and asking her to 
take off her shirt. Pt was redirected and told to stop making inappropriate comments. Pt 
demands medication every hour including PRN and gets argumentative when told he is not due 
for medications every hour. Pt is intrusive. Pt has [facility] and "stated I see One person morphing 
in to 3 people" "I was awaken by a mid century  demon". Reassurance provided. Continue to 
monitor for safety , continue with treatment plan.

## 2023-04-05 NOTE — NUR
GPS:   Pt.is easily irritable and intrusive at times. Re-directed prn. Med.compliant and 
expects meds.to be given to him promptly when he asks for them. Has labile mood. No 
aggressive behavior noted. Safe environment provided. Paranoid and suspicious. Needs 
attended.

## 2023-04-06 VITALS — DIASTOLIC BLOOD PRESSURE: 63 MMHG | SYSTOLIC BLOOD PRESSURE: 119 MMHG

## 2023-04-06 VITALS — DIASTOLIC BLOOD PRESSURE: 62 MMHG | SYSTOLIC BLOOD PRESSURE: 129 MMHG

## 2023-04-06 RX ADMIN — MUPIROCIN SCH APPLIC: 20 OINTMENT TOPICAL at 20:08

## 2023-04-06 RX ADMIN — GABAPENTIN SCH MG: 300 CAPSULE ORAL at 16:33

## 2023-04-06 RX ADMIN — CARVEDILOL SCH MG: 6.25 TABLET, FILM COATED ORAL at 20:10

## 2023-04-06 RX ADMIN — ATORVASTATIN CALCIUM SCH MG: 40 TABLET, FILM COATED ORAL at 20:09

## 2023-04-06 RX ADMIN — METHADONE HYDROCHLORIDE SCH MG: 10 CONCENTRATE ORAL at 09:03

## 2023-04-06 RX ADMIN — CARVEDILOL SCH MG: 6.25 TABLET, FILM COATED ORAL at 09:07

## 2023-04-06 RX ADMIN — LORAZEPAM PRN MG: 0.5 TABLET ORAL at 17:16

## 2023-04-06 RX ADMIN — ASPIRIN SCH MG: 81 TABLET, CHEWABLE ORAL at 09:04

## 2023-04-06 RX ADMIN — QUETIAPINE SCH MG: 25 TABLET, FILM COATED ORAL at 16:33

## 2023-04-06 RX ADMIN — MUPIROCIN SCH APPLIC: 20 OINTMENT TOPICAL at 09:10

## 2023-04-06 RX ADMIN — GABAPENTIN SCH MG: 300 CAPSULE ORAL at 09:03

## 2023-04-06 RX ADMIN — LISINOPRIL SCH MG: 10 TABLET ORAL at 09:06

## 2023-04-06 RX ADMIN — QUETIAPINE SCH MG: 25 TABLET, FILM COATED ORAL at 13:35

## 2023-04-06 RX ADMIN — LORAZEPAM PRN MG: 0.5 TABLET ORAL at 11:07

## 2023-04-06 RX ADMIN — TEMAZEPAM PRN MG: 7.5 CAPSULE ORAL at 20:15

## 2023-04-06 RX ADMIN — QUETIAPINE SCH MG: 200 TABLET, FILM COATED ORAL at 20:09

## 2023-04-06 RX ADMIN — QUETIAPINE SCH MG: 25 TABLET, FILM COATED ORAL at 09:10

## 2023-04-06 NOTE — NUR
Gps/Lvn- Kept coming to the Nurses station , asking fo his snacks , any medicines due , 
jokes with the staff , claimed  waited for 2 hours this am for his lorazepam, informed 
patient , went and checked him appeared comfortable on his left side, asleep, so writer did 
not wake up patient , claimed he was resting

## 2023-04-06 NOTE — NUR
Gps/Lvn-Patient requested to have his valuables open , wants a number of his credit card , 
noted patient valuables are still in pt's  old  valuables envelope ( from other Hosp. #  
[id number]). Valuable was recounted, evaluated w/ patient's request,  was able to signed , 
secured, stapled, put back in the safe. Patient agreed everything is ok , as checked, 
(Secured in the  Unit Hosp safe ) # [id number]

## 2023-04-07 VITALS — SYSTOLIC BLOOD PRESSURE: 99 MMHG | DIASTOLIC BLOOD PRESSURE: 60 MMHG

## 2023-04-07 VITALS — SYSTOLIC BLOOD PRESSURE: 119 MMHG | DIASTOLIC BLOOD PRESSURE: 56 MMHG

## 2023-04-07 VITALS — DIASTOLIC BLOOD PRESSURE: 56 MMHG | SYSTOLIC BLOOD PRESSURE: 120 MMHG

## 2023-04-07 RX ADMIN — QUETIAPINE SCH MG: 25 TABLET, FILM COATED ORAL at 17:47

## 2023-04-07 RX ADMIN — TEMAZEPAM PRN MG: 7.5 CAPSULE ORAL at 20:14

## 2023-04-07 RX ADMIN — CARVEDILOL SCH MG: 6.25 TABLET, FILM COATED ORAL at 09:12

## 2023-04-07 RX ADMIN — MUPIROCIN SCH APPLIC: 20 OINTMENT TOPICAL at 09:09

## 2023-04-07 RX ADMIN — GABAPENTIN SCH MG: 300 CAPSULE ORAL at 09:10

## 2023-04-07 RX ADMIN — METHADONE HYDROCHLORIDE SCH MG: 10 CONCENTRATE ORAL at 09:12

## 2023-04-07 RX ADMIN — QUETIAPINE SCH MG: 25 TABLET, FILM COATED ORAL at 09:32

## 2023-04-07 RX ADMIN — GABAPENTIN SCH MG: 300 CAPSULE ORAL at 17:48

## 2023-04-07 RX ADMIN — CARVEDILOL SCH MG: 6.25 TABLET, FILM COATED ORAL at 20:14

## 2023-04-07 RX ADMIN — MUPIROCIN SCH APPLIC: 20 OINTMENT TOPICAL at 20:13

## 2023-04-07 RX ADMIN — ATORVASTATIN CALCIUM SCH MG: 40 TABLET, FILM COATED ORAL at 20:13

## 2023-04-07 RX ADMIN — LORAZEPAM PRN MG: 0.5 TABLET ORAL at 17:47

## 2023-04-07 RX ADMIN — LISINOPRIL SCH MG: 10 TABLET ORAL at 09:10

## 2023-04-07 RX ADMIN — QUETIAPINE SCH MG: 200 TABLET, FILM COATED ORAL at 20:13

## 2023-04-07 RX ADMIN — LORAZEPAM PRN MG: 0.5 TABLET ORAL at 11:34

## 2023-04-07 RX ADMIN — ASPIRIN SCH MG: 81 TABLET, CHEWABLE ORAL at 09:10

## 2023-04-07 NOTE — NUR
[facility] Hearing: 



Patient had 5250 probable cause hearing today and it was upheld for grave disability.

## 2023-04-07 NOTE — NUR
Gps/Lvn -Gets intrusive, needy, constantly  reviewing his medications when its due, claimed 
staff are not monitoring medications scheduled . Had been in and out of the activity room , 
focused on his pain issues ( lower back knees , shoulder pain)

## 2023-04-08 VITALS — DIASTOLIC BLOOD PRESSURE: 65 MMHG | SYSTOLIC BLOOD PRESSURE: 119 MMHG

## 2023-04-08 VITALS — SYSTOLIC BLOOD PRESSURE: 143 MMHG | DIASTOLIC BLOOD PRESSURE: 66 MMHG

## 2023-04-08 VITALS — SYSTOLIC BLOOD PRESSURE: 125 MMHG | DIASTOLIC BLOOD PRESSURE: 65 MMHG

## 2023-04-08 RX ADMIN — LORAZEPAM PRN MG: 0.5 TABLET ORAL at 09:15

## 2023-04-08 RX ADMIN — METHADONE HYDROCHLORIDE SCH MG: 10 CONCENTRATE ORAL at 09:51

## 2023-04-08 RX ADMIN — LISINOPRIL SCH MG: 10 TABLET ORAL at 08:55

## 2023-04-08 RX ADMIN — MUPIROCIN SCH APPLIC: 20 OINTMENT TOPICAL at 20:32

## 2023-04-08 RX ADMIN — MUPIROCIN SCH APPLIC: 20 OINTMENT TOPICAL at 08:58

## 2023-04-08 RX ADMIN — GABAPENTIN SCH MG: 300 CAPSULE ORAL at 08:58

## 2023-04-08 RX ADMIN — GABAPENTIN SCH MG: 300 CAPSULE ORAL at 17:11

## 2023-04-08 RX ADMIN — QUETIAPINE SCH MG: 200 TABLET, FILM COATED ORAL at 20:33

## 2023-04-08 RX ADMIN — ASPIRIN SCH MG: 81 TABLET, CHEWABLE ORAL at 08:55

## 2023-04-08 RX ADMIN — QUETIAPINE SCH MG: 25 TABLET, FILM COATED ORAL at 17:12

## 2023-04-08 RX ADMIN — CARVEDILOL SCH MG: 6.25 TABLET, FILM COATED ORAL at 08:56

## 2023-04-08 RX ADMIN — ATORVASTATIN CALCIUM SCH MG: 40 TABLET, FILM COATED ORAL at 20:32

## 2023-04-08 RX ADMIN — TEMAZEPAM PRN MG: 7.5 CAPSULE ORAL at 20:42

## 2023-04-08 RX ADMIN — CARVEDILOL SCH MG: 6.25 TABLET, FILM COATED ORAL at 20:34

## 2023-04-08 RX ADMIN — QUETIAPINE SCH MG: 25 TABLET, FILM COATED ORAL at 08:56

## 2023-04-08 RX ADMIN — LORAZEPAM PRN MG: 0.5 TABLET ORAL at 16:06

## 2023-04-08 NOTE — NUR
Gps/Lvn- Noted patient > anxious, wants to check what medications he is due for,  reviewed 
all meds, with patient, will wait for next dose of anxiety pill. Per TETO Mccabe , patient 
kept coming into her offeice requesting to apply lotion on his back, wants to stay in the 
patio longer  during his lunch ,  limit setting provided . Staring spells , needy ,intrusive

## 2023-04-09 VITALS — SYSTOLIC BLOOD PRESSURE: 116 MMHG | DIASTOLIC BLOOD PRESSURE: 69 MMHG

## 2023-04-09 VITALS — DIASTOLIC BLOOD PRESSURE: 66 MMHG | SYSTOLIC BLOOD PRESSURE: 147 MMHG

## 2023-04-09 VITALS — DIASTOLIC BLOOD PRESSURE: 59 MMHG | SYSTOLIC BLOOD PRESSURE: 99 MMHG

## 2023-04-09 RX ADMIN — MUPIROCIN SCH APPLIC: 20 OINTMENT TOPICAL at 20:36

## 2023-04-09 RX ADMIN — QUETIAPINE SCH MG: 200 TABLET, FILM COATED ORAL at 20:37

## 2023-04-09 RX ADMIN — QUETIAPINE SCH MG: 25 TABLET, FILM COATED ORAL at 16:27

## 2023-04-09 RX ADMIN — CARVEDILOL SCH MG: 6.25 TABLET, FILM COATED ORAL at 20:37

## 2023-04-09 RX ADMIN — LORAZEPAM PRN MG: 0.5 TABLET ORAL at 09:18

## 2023-04-09 RX ADMIN — TEMAZEPAM PRN MG: 7.5 CAPSULE ORAL at 23:48

## 2023-04-09 RX ADMIN — METHADONE HYDROCHLORIDE SCH MG: 10 CONCENTRATE ORAL at 09:05

## 2023-04-09 RX ADMIN — CARVEDILOL SCH MG: 6.25 TABLET, FILM COATED ORAL at 09:00

## 2023-04-09 RX ADMIN — QUETIAPINE SCH MG: 25 TABLET, FILM COATED ORAL at 09:08

## 2023-04-09 RX ADMIN — GABAPENTIN SCH MG: 300 CAPSULE ORAL at 16:26

## 2023-04-09 RX ADMIN — LORAZEPAM PRN MG: 0.5 TABLET ORAL at 20:37

## 2023-04-09 RX ADMIN — ASPIRIN SCH MG: 81 TABLET, CHEWABLE ORAL at 09:05

## 2023-04-09 RX ADMIN — ACETAMINOPHEN PRN MG: 325 TABLET ORAL at 20:38

## 2023-04-09 RX ADMIN — LISINOPRIL SCH MG: 10 TABLET ORAL at 09:00

## 2023-04-09 RX ADMIN — MUPIROCIN SCH APPLIC: 20 OINTMENT TOPICAL at 09:08

## 2023-04-09 RX ADMIN — ATORVASTATIN CALCIUM SCH MG: 40 TABLET, FILM COATED ORAL at 20:37

## 2023-04-09 RX ADMIN — LORAZEPAM PRN MG: 0.5 TABLET ORAL at 15:19

## 2023-04-09 RX ADMIN — GABAPENTIN SCH MG: 300 CAPSULE ORAL at 09:04

## 2023-04-09 NOTE — NUR
Gps/Lvn- Remains to get needy, argumentative , particular about the time his routine meds 
and prn was given, wants to review routine meds , was given m informed and reminded patient 
he was given list of his medications that he's been taking here . Claimed at times he 
forgets the time. Patient requesting  all hos medications dues at  9 pmm, be given at 8 pm,  
instructed to talk to the Noc. Nurse.

## 2023-04-09 NOTE — NUR
Gps/Lvn- Informed Logan Locke NP regarding blood pressure  orders received  for parameters 
on b/p meds. Remains needy , intrusive behavior , was able to stay in the patio with staff 
supervision .

## 2023-04-09 NOTE — NUR
Pt is  intrusive, argumentative, confrontational, needy, anxious and makes inappropriate 
comments. Pt likes to call female staff Baby and gets too close. Pt was redirected to stop 
making inappropriate comments and to keep a distance not invading personal space.Pt comes to 
nursing station constantly asking for snacks. Reassurance provided, Limits setting ongoing. 
Continue to monitor for safety, Continue with treatment plan.

## 2023-04-10 VITALS — DIASTOLIC BLOOD PRESSURE: 50 MMHG | SYSTOLIC BLOOD PRESSURE: 118 MMHG

## 2023-04-10 VITALS — SYSTOLIC BLOOD PRESSURE: 118 MMHG | DIASTOLIC BLOOD PRESSURE: 51 MMHG

## 2023-04-10 VITALS — DIASTOLIC BLOOD PRESSURE: 60 MMHG | SYSTOLIC BLOOD PRESSURE: 122 MMHG

## 2023-04-10 RX ADMIN — ATORVASTATIN CALCIUM SCH MG: 40 TABLET, FILM COATED ORAL at 20:25

## 2023-04-10 RX ADMIN — GABAPENTIN SCH MG: 300 CAPSULE ORAL at 17:24

## 2023-04-10 RX ADMIN — LORAZEPAM PRN MG: 0.5 TABLET ORAL at 13:52

## 2023-04-10 RX ADMIN — LISINOPRIL SCH MG: 20 TABLET ORAL at 08:50

## 2023-04-10 RX ADMIN — CARVEDILOL SCH MG: 6.25 TABLET, FILM COATED ORAL at 08:50

## 2023-04-10 RX ADMIN — CARVEDILOL SCH MG: 6.25 TABLET, FILM COATED ORAL at 20:25

## 2023-04-10 RX ADMIN — METHADONE HYDROCHLORIDE SCH MG: 10 CONCENTRATE ORAL at 08:50

## 2023-04-10 RX ADMIN — ASPIRIN SCH MG: 81 TABLET, CHEWABLE ORAL at 08:49

## 2023-04-10 RX ADMIN — LORAZEPAM PRN MG: 0.5 TABLET ORAL at 20:47

## 2023-04-10 RX ADMIN — LORAZEPAM PRN MG: 0.5 TABLET ORAL at 09:10

## 2023-04-10 RX ADMIN — TEMAZEPAM PRN MG: 7.5 CAPSULE ORAL at 20:31

## 2023-04-10 RX ADMIN — OXCARBAZEPINE SCH MG: 300 TABLET, FILM COATED ORAL at 11:03

## 2023-04-10 RX ADMIN — QUETIAPINE SCH MG: 25 TABLET, FILM COATED ORAL at 17:24

## 2023-04-10 RX ADMIN — QUETIAPINE SCH MG: 25 TABLET, FILM COATED ORAL at 08:49

## 2023-04-10 RX ADMIN — MUPIROCIN SCH APPLIC: 20 OINTMENT TOPICAL at 08:49

## 2023-04-10 RX ADMIN — MUPIROCIN SCH APPLIC: 20 OINTMENT TOPICAL at 20:24

## 2023-04-10 RX ADMIN — OXCARBAZEPINE SCH MG: 300 TABLET, FILM COATED ORAL at 17:24

## 2023-04-10 RX ADMIN — QUETIAPINE SCH MG: 200 TABLET, FILM COATED ORAL at 20:25

## 2023-04-10 RX ADMIN — GABAPENTIN SCH MG: 300 CAPSULE ORAL at 08:49

## 2023-04-10 NOTE — NUR
GPS: Nursing Notes: Destructive Behavior To Others:

Patient is awake and responding to his name, slightly paranoid with his medications, but 
compliant, needy at times, cooperative with nursing care, needs prompting to participate in 
therapeutic groups, argumentative at times, no aggressive behavior noted, needy at times, 
unable to formulate a viable plan for self care, continue to monitor for safety, continue 
with treatment plan.

## 2023-04-10 NOTE — NUR
Pt still can intrusive at times, needy, and attention seeking. Still overall compliant with 
care plan. Still prefers to have medications identified and explained their purpose during 
med pass schedule. Still requests to have prn medications round-the-clock. Safety measures 
in place.

## 2023-04-11 VITALS — SYSTOLIC BLOOD PRESSURE: 136 MMHG | DIASTOLIC BLOOD PRESSURE: 60 MMHG

## 2023-04-11 VITALS — DIASTOLIC BLOOD PRESSURE: 64 MMHG | SYSTOLIC BLOOD PRESSURE: 132 MMHG

## 2023-04-11 VITALS — SYSTOLIC BLOOD PRESSURE: 130 MMHG | DIASTOLIC BLOOD PRESSURE: 67 MMHG

## 2023-04-11 RX ADMIN — CARVEDILOL SCH MG: 6.25 TABLET, FILM COATED ORAL at 20:13

## 2023-04-11 RX ADMIN — QUETIAPINE SCH MG: 25 TABLET, FILM COATED ORAL at 08:53

## 2023-04-11 RX ADMIN — METHADONE HYDROCHLORIDE SCH MG: 10 CONCENTRATE ORAL at 08:53

## 2023-04-11 RX ADMIN — LORAZEPAM PRN MG: 0.5 TABLET ORAL at 04:13

## 2023-04-11 RX ADMIN — ASPIRIN SCH MG: 81 TABLET, CHEWABLE ORAL at 08:53

## 2023-04-11 RX ADMIN — LISINOPRIL SCH MG: 20 TABLET ORAL at 08:53

## 2023-04-11 RX ADMIN — MUPIROCIN SCH APPLIC: 20 OINTMENT TOPICAL at 08:54

## 2023-04-11 RX ADMIN — QUETIAPINE SCH MG: 25 TABLET, FILM COATED ORAL at 17:10

## 2023-04-11 RX ADMIN — MUPIROCIN SCH APPLIC: 20 OINTMENT TOPICAL at 20:13

## 2023-04-11 RX ADMIN — GABAPENTIN SCH MG: 300 CAPSULE ORAL at 08:53

## 2023-04-11 RX ADMIN — OXCARBAZEPINE SCH MG: 300 TABLET, FILM COATED ORAL at 08:53

## 2023-04-11 RX ADMIN — CARVEDILOL SCH MG: 6.25 TABLET, FILM COATED ORAL at 08:54

## 2023-04-11 RX ADMIN — OXCARBAZEPINE SCH MG: 300 TABLET, FILM COATED ORAL at 17:10

## 2023-04-11 RX ADMIN — ATORVASTATIN CALCIUM SCH MG: 40 TABLET, FILM COATED ORAL at 20:13

## 2023-04-11 RX ADMIN — QUETIAPINE SCH MG: 25 TABLET, FILM COATED ORAL at 12:40

## 2023-04-11 RX ADMIN — GABAPENTIN SCH MG: 300 CAPSULE ORAL at 17:10

## 2023-04-11 RX ADMIN — TEMAZEPAM PRN MG: 7.5 CAPSULE ORAL at 20:14

## 2023-04-11 NOTE — NUR
GPS: Nursing Notes: Destructive Behavior To Others:

Patient is awake and responding to his name, isolative and withdrawn in his room, 
argumentative at times, A/Ox4, episode so sleeping during the day, stated "It is too noisy 
during the night...I cannot sleep at times... They woke me up most of the time..", no 
aggressive behavior noted, following staff directions, unable to formulate a viable plan for 
self care, continue to monitor for safety, continue with treatment plan.

## 2023-04-12 VITALS — SYSTOLIC BLOOD PRESSURE: 116 MMHG | DIASTOLIC BLOOD PRESSURE: 64 MMHG

## 2023-04-12 VITALS — SYSTOLIC BLOOD PRESSURE: 129 MMHG | DIASTOLIC BLOOD PRESSURE: 65 MMHG

## 2023-04-12 VITALS — DIASTOLIC BLOOD PRESSURE: 63 MMHG | SYSTOLIC BLOOD PRESSURE: 124 MMHG

## 2023-04-12 RX ADMIN — ASPIRIN SCH MG: 81 TABLET, CHEWABLE ORAL at 08:28

## 2023-04-12 RX ADMIN — METHADONE HYDROCHLORIDE SCH MG: 10 CONCENTRATE ORAL at 08:43

## 2023-04-12 RX ADMIN — CARVEDILOL SCH MG: 6.25 TABLET, FILM COATED ORAL at 08:30

## 2023-04-12 RX ADMIN — TEMAZEPAM PRN MG: 7.5 CAPSULE ORAL at 20:20

## 2023-04-12 RX ADMIN — ATORVASTATIN CALCIUM SCH MG: 40 TABLET, FILM COATED ORAL at 20:19

## 2023-04-12 RX ADMIN — OXCARBAZEPINE SCH MG: 300 TABLET, FILM COATED ORAL at 08:27

## 2023-04-12 RX ADMIN — QUETIAPINE SCH MG: 200 TABLET, FILM COATED ORAL at 20:19

## 2023-04-12 RX ADMIN — OXCARBAZEPINE SCH MG: 300 TABLET, FILM COATED ORAL at 17:15

## 2023-04-12 RX ADMIN — LISINOPRIL SCH MG: 20 TABLET ORAL at 08:28

## 2023-04-12 RX ADMIN — GABAPENTIN SCH MG: 300 CAPSULE ORAL at 08:27

## 2023-04-12 RX ADMIN — GABAPENTIN SCH MG: 300 CAPSULE ORAL at 17:15

## 2023-04-12 RX ADMIN — QUETIAPINE SCH MG: 25 TABLET, FILM COATED ORAL at 17:16

## 2023-04-12 RX ADMIN — QUETIAPINE SCH MG: 25 TABLET, FILM COATED ORAL at 08:28

## 2023-04-12 RX ADMIN — QUETIAPINE SCH MG: 25 TABLET, FILM COATED ORAL at 12:16

## 2023-04-12 RX ADMIN — CARVEDILOL SCH MG: 6.25 TABLET, FILM COATED ORAL at 20:20

## 2023-04-12 NOTE — NUR
Pt is less anxious, less  needy, less argumentative and  less confrontational   when his 
needs are not met right away. Pt is medication compliant.  Pt  was in his room most of the 
day  calm and cooperative. Continues to hear voices. Continue to monitor for safety.  
Continue with treatment plan.

## 2023-04-13 VITALS — SYSTOLIC BLOOD PRESSURE: 139 MMHG | DIASTOLIC BLOOD PRESSURE: 74 MMHG

## 2023-04-13 VITALS — DIASTOLIC BLOOD PRESSURE: 73 MMHG | SYSTOLIC BLOOD PRESSURE: 158 MMHG

## 2023-04-13 VITALS — DIASTOLIC BLOOD PRESSURE: 86 MMHG | SYSTOLIC BLOOD PRESSURE: 160 MMHG

## 2023-04-13 RX ADMIN — QUETIAPINE SCH MG: 25 TABLET, FILM COATED ORAL at 09:24

## 2023-04-13 RX ADMIN — OXCARBAZEPINE SCH MG: 300 TABLET, FILM COATED ORAL at 09:25

## 2023-04-13 RX ADMIN — QUETIAPINE SCH MG: 200 TABLET, FILM COATED ORAL at 20:55

## 2023-04-13 RX ADMIN — QUETIAPINE SCH MG: 25 TABLET, FILM COATED ORAL at 17:35

## 2023-04-13 RX ADMIN — GABAPENTIN SCH MG: 300 CAPSULE ORAL at 09:24

## 2023-04-13 RX ADMIN — GABAPENTIN SCH MG: 300 CAPSULE ORAL at 17:35

## 2023-04-13 RX ADMIN — ASPIRIN SCH MG: 81 TABLET, CHEWABLE ORAL at 09:24

## 2023-04-13 RX ADMIN — METHADONE HYDROCHLORIDE SCH MG: 10 CONCENTRATE ORAL at 09:25

## 2023-04-13 RX ADMIN — ATORVASTATIN CALCIUM SCH MG: 40 TABLET, FILM COATED ORAL at 21:37

## 2023-04-13 RX ADMIN — LORAZEPAM PRN MG: 0.5 TABLET ORAL at 12:27

## 2023-04-13 RX ADMIN — LISINOPRIL SCH MG: 20 TABLET ORAL at 09:25

## 2023-04-13 RX ADMIN — QUETIAPINE SCH MG: 25 TABLET, FILM COATED ORAL at 12:27

## 2023-04-13 RX ADMIN — CARVEDILOL SCH MG: 6.25 TABLET, FILM COATED ORAL at 09:31

## 2023-04-13 RX ADMIN — OXCARBAZEPINE SCH MG: 300 TABLET, FILM COATED ORAL at 17:35

## 2023-04-13 RX ADMIN — CARVEDILOL SCH MG: 6.25 TABLET, FILM COATED ORAL at 20:57

## 2023-04-13 NOTE — NUR
Pt is compliant with medication but still paranoid, checking the numbers on the pill making 
sure is the right dose and right medication he takes every 

day. Reassurance provided. Pt is less anxious but is isolative withdrawn. No aggressive 
behavior noted on this shift. Pt is able to perform his own ADLs. Safety measures put in 
place.continue to monitor for safety, continue with treatment plan.

## 2023-04-14 VITALS — SYSTOLIC BLOOD PRESSURE: 172 MMHG | DIASTOLIC BLOOD PRESSURE: 82 MMHG

## 2023-04-14 RX ADMIN — ASPIRIN SCH MG: 81 TABLET, CHEWABLE ORAL at 09:30

## 2023-04-14 RX ADMIN — QUETIAPINE SCH MG: 25 TABLET, FILM COATED ORAL at 09:30

## 2023-04-14 RX ADMIN — LISINOPRIL SCH MG: 20 TABLET ORAL at 09:30

## 2023-04-14 RX ADMIN — CARVEDILOL SCH MG: 6.25 TABLET, FILM COATED ORAL at 09:31

## 2023-04-14 RX ADMIN — OXCARBAZEPINE SCH MG: 300 TABLET, FILM COATED ORAL at 09:30

## 2023-04-14 RX ADMIN — GABAPENTIN SCH MG: 300 CAPSULE ORAL at 09:30

## 2023-04-14 RX ADMIN — METHADONE HYDROCHLORIDE SCH MG: 10 CONCENTRATE ORAL at 09:29

## 2023-04-14 NOTE — NUR
Patient still requesting medications before their schedule time. Re-education provided 
regarding his scheduled meds for the evening, and stressed the importance of allowing them 
to work before finding the need for prn meds. Reminded of the purpose, frequency and some 
common contraindications regarding his prn meds. Pt verbalized understanding. Patient stayed 
in his room sleeping all night. Less intrusive than previous shifts. Safety measures in 
place. Will continue to monitor.

## 2023-04-14 NOTE — NUR
Gps/Lvn- Called [facility] report was given to Aniyah GRISSOM . Patient was well informed of 
his discharge .  time arranged for 1100 am. Patient was well informed of his 
discharge .All valuables and belongings given back to patient Patient in. good spirit, no 
complaints noted.

## 2024-09-19 NOTE — NUR
GPS:   Pt.slept 5.30 last night and currently still in bed resting. Continues to feel 
anxious,needy and irritable when demands are not granted. Re-directed and re-assured prn. 
Asks for his meds eventhough they are not due yet. Safety emphasized. Will continue to 
monitor. [FreeTextEntry1] : 47M w/ PMH of Cervical Radiculopathy, ETOH abuse in the CPE. [de-identified] : 47M w/ PMH of Cervical Radiculopathy, ETOH abuse in the CPE.  Stopped drinking for 6 months. Had 4 day binge (9/11 to 9/14). 10+ drinks per day during that time. No drinks since 9/14. Mild sweating, mild tremors, and insomnia since then. HA. Denies seizures or other ROS.   PT is helping his cervical radiculopathy.  Otherwise, no acute complaints.